# Patient Record
Sex: FEMALE | Race: WHITE | NOT HISPANIC OR LATINO | URBAN - METROPOLITAN AREA
[De-identification: names, ages, dates, MRNs, and addresses within clinical notes are randomized per-mention and may not be internally consistent; named-entity substitution may affect disease eponyms.]

---
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
## 2021-05-31 VITALS — HEIGHT: 67 IN | BODY MASS INDEX: 19.26 KG/M2 | WEIGHT: 122.7 LBS

## 2021-05-31 VITALS — BODY MASS INDEX: 20.04 KG/M2 | HEIGHT: 67 IN | WEIGHT: 127.7 LBS

## 2021-06-14 NOTE — PROGRESS NOTES
Gisela Artis  2000      Assessment and Plan:  1 first visit preventive health maintenance; up-to-date on immunizations; recent visit with GYN/  2. Tested + ADHD/mom has same- vyvanse has helped in past - wants to try again  3 controlled substance agreement signed for Vyvanse she will  a 30 day supply and get two Rxs  hard copies sent to her and checking with us in 3-6 months if she does indeed moved to [facility] she will need to get reestablished there for ongoing prescriptions        Chief Complaint: first visit/ADHD    Visit diagnoses:    1. ADHD  lisdexamfetamine (VYVANSE) 40 MG capsule    DISCONTINUED: lisdexamfetamine (VYVANSE) 40 MG capsule     Patient Active Problem List   Diagnosis     ADHD     Past Medical History:   Diagnosis Date     ADHD      No past surgical history on file.  Social History     Social History     Marital status: Single     Spouse name: N/A     Number of children: 0     Years of education: N/A     Occupational History     Student      Social History Main Topics     Smoking status: Never Smoker     Smokeless tobacco: Never Used     Alcohol use No     Drug use: No     Sexual activity: No     Other Topics Concern     Not on file     Social History Narrative    Single; college student [employer] [place] [employer]; grew up in [facility] biological father  mother at age 1; stepfather (Alber who resides in [place])  involved in raising her from age [age]; she moved to the [facility] ~2016 with her mother who is a  this past summer; she lives with roommates from [employer]; is planning on moving to [place] with mother and her mother's fiancé and will probably work there sometime in 2018.    No history of drug related problems.  Non-smoker.  No alcohol ; currently no significant other. (2017) one biologic brother ~20 and 4 step siblings      Family History   Problem Relation Age of Onset     ADD / ADHD Mother      Meds:  Current Outpatient  "Prescriptions   Medication Sig Dispense Refill     [START ON 1/16/2018] lisdexamfetamine (VYVANSE) 40 MG capsule Take 1 capsule (40 mg total) by mouth every morning. 30 capsule 0     No current facility-administered medications for this visit.        No Known Allergies    ROS: complete review of symptoms otherwise negative except as noted below    S: Healthy young woman; recently had a GYN exam at [facility] she wants prescription for a ADHD she tested at [facility] in [place] and was positive; she has benefited from Vyvanse in the past; otherwise no major issues or concerns; she may be moving to [place] in the near future the records from [facility] will be scanned into her electronic health record here;       O:   Vitals:    11/16/17 0955   BP: 110/62   Patient Site: Left Arm   Patient Position: Sitting   Cuff Size: Adult Regular   Pulse: 76   Weight: 127 lb 11.2 oz (57.9 kg)   Height: 5' 6.5\" (1.689 m)       Physical Exam:  General-very pleasant healthy-appearing young woman; some acne on face; very well spoken and animated.  VS- see above  Remainder of exam deferred    See extensive  records from ADHD testing          Alphonse Lizarraga MD              "

## 2021-06-14 NOTE — PROGRESS NOTES
"Gisela Artis  2000      Assessment and Plan:  1.  ADHD some improvement with 30 mg Vyvanse wants to increase to 60  2 she now plans to move to [place] to be with her father she may be going to [facility].  I gave her 3 prescription for 3 months worth of Vyvanse she will need to get established with a provider there perhaps with the [employer] service to get ongoing prescriptions.  It is not clear when if ever she is going to be back in [place] it sounds like her mother is moving to [place].  3 she will keep all the documentation of her diagnosis that she had done in [place] with consultants up there especially if she is moving out of town.    Chief Complaint: ADHD    Visit diagnoses:    1. ADHD  DISCONTINUED: lisdexamfetamine (VYVANSE) 60 MG capsule   2. Attention deficit hyperactivity disorder (ADHD), combined type  lisdexamfetamine (VYVANSE) 60 MG capsule    DISCONTINUED: lisdexamfetamine (VYVANSE) 60 MG capsule       Meds:  Current Outpatient Prescriptions   Medication Sig Dispense Refill     lisdexamfetamine (VYVANSE) 60 MG capsule Take 1 capsule (60 mg total) by mouth daily. (not to be filled till 2/11/2018) 30 capsule 0     No current facility-administered medications for this visit.        No Known Allergies    ROS: complete review of symptoms otherwise negative except as noted below    S: She is pleased with the reaction on the Vyvanse she has been out of before previously she was on 60 mg daily       O:   Vitals:    12/11/17 1539   BP: 110/72   Patient Site: Left Arm   Patient Position: Sitting   Cuff Size: Adult Regular   Pulse: 80   Weight: 122 lb 11.2 oz (55.7 kg)   Height: 5' 6.5\" (1.689 m)       Physical Exam:  General-  VS- see above   -    Total time 15 minutes >50% spent counseling      Alphonse Lizarraga MD              "

## 2021-06-16 PROBLEM — F90.9 ADHD: Status: ACTIVE | Noted: 2017-11-16
